# Patient Record
Sex: FEMALE | Race: ASIAN | NOT HISPANIC OR LATINO | Employment: FULL TIME | URBAN - METROPOLITAN AREA
[De-identification: names, ages, dates, MRNs, and addresses within clinical notes are randomized per-mention and may not be internally consistent; named-entity substitution may affect disease eponyms.]

---

## 2019-03-01 ENCOUNTER — MEDICAL CORRESPONDENCE (OUTPATIENT)
Dept: HEALTH INFORMATION MANAGEMENT | Facility: CLINIC | Age: 24
End: 2019-03-01

## 2019-03-03 ENCOUNTER — TRANSFERRED RECORDS (OUTPATIENT)
Dept: HEALTH INFORMATION MANAGEMENT | Facility: CLINIC | Age: 24
End: 2019-03-03

## 2019-03-04 ENCOUNTER — AMBULATORY - HEALTHEAST (OUTPATIENT)
Dept: MATERNAL FETAL MEDICINE | Facility: HOSPITAL | Age: 24
End: 2019-03-04

## 2019-03-04 ENCOUNTER — TELEPHONE (OUTPATIENT)
Dept: MATERNAL FETAL MEDICINE | Facility: CLINIC | Age: 24
End: 2019-03-04

## 2019-03-04 DIAGNOSIS — O26.90 PREGNANCY, ANTEPARTUM, COMPLICATIONS: ICD-10-CM

## 2019-03-04 DIAGNOSIS — O35.9XX0 SUSPECTED FETAL ANOMALY, ANTEPARTUM: ICD-10-CM

## 2019-03-04 DIAGNOSIS — O36.5990 PREGNANCY AFFECTED BY FETAL GROWTH RESTRICTION: Primary | ICD-10-CM

## 2019-03-04 NOTE — TELEPHONE ENCOUNTER
Called Pb and requested information on GI MD. Sent VITA to her husbands email. Will await VITA form patient to request records.  Jennifer Lang RN

## 2019-03-07 ENCOUNTER — TELEPHONE (OUTPATIENT)
Dept: MATERNAL FETAL MEDICINE | Facility: CLINIC | Age: 24
End: 2019-03-07

## 2019-03-07 ENCOUNTER — PRE VISIT (OUTPATIENT)
Dept: MATERNAL FETAL MEDICINE | Facility: CLINIC | Age: 24
End: 2019-03-07

## 2019-03-07 NOTE — TELEPHONE ENCOUNTER
Writer called and spoke to  to verify email address to send VITA for [facility]. VITA will be sent to correct email.  Jennifer Lang RN

## 2019-03-11 ENCOUNTER — OFFICE VISIT (OUTPATIENT)
Dept: MATERNAL FETAL MEDICINE | Facility: CLINIC | Age: 24
End: 2019-03-11
Attending: OBSTETRICS & GYNECOLOGY

## 2019-03-11 ENCOUNTER — AMBULATORY - HEALTHEAST (OUTPATIENT)
Dept: MATERNAL FETAL MEDICINE | Facility: HOSPITAL | Age: 24
End: 2019-03-11

## 2019-03-11 ENCOUNTER — HOSPITAL ENCOUNTER (OUTPATIENT)
Dept: ULTRASOUND IMAGING | Facility: CLINIC | Age: 24
Discharge: HOME | End: 2019-03-11
Attending: OBSTETRICS & GYNECOLOGY | Admitting: OBSTETRICS & GYNECOLOGY

## 2019-03-11 DIAGNOSIS — O35.9XX0 SUSPECTED FETAL ANOMALY, ANTEPARTUM, SINGLE OR UNSPECIFIED FETUS: Primary | ICD-10-CM

## 2019-03-11 DIAGNOSIS — O36.5990 PREGNANCY AFFECTED BY FETAL GROWTH RESTRICTION: ICD-10-CM

## 2019-03-11 DIAGNOSIS — O35.9XX0 SUSPECTED FETAL ANOMALY, ANTEPARTUM: ICD-10-CM

## 2019-03-11 DIAGNOSIS — O26.90 PREGNANCY, ANTEPARTUM, COMPLICATIONS: ICD-10-CM

## 2019-03-11 DIAGNOSIS — Z36.89 ENCOUNTER FOR ULTRASOUND TO ASSESS FETAL GROWTH: ICD-10-CM

## 2019-03-11 PROCEDURE — 76811 OB US DETAILED SNGL FETUS: CPT

## 2019-03-11 NOTE — PROGRESS NOTES
"Please see \"Imaging\" tab under \"Chart Review\" for details of today's US at the [facility].    Adrian Orr MD  Maternal-Fetal Medicine      "

## 2019-03-12 ENCOUNTER — DOCUMENTATION ONLY (OUTPATIENT)
Dept: MATERNAL FETAL MEDICINE | Facility: CLINIC | Age: 24
End: 2019-03-12

## 2019-03-12 NOTE — PROGRESS NOTES
Received VITA from [facility] for [facility]. Faxed 3/12 at [phone]. Will await records.  Jennifer Lang RN

## 2019-03-18 ENCOUNTER — TELEPHONE (OUTPATIENT)
Dept: MATERNAL FETAL MEDICINE | Facility: CLINIC | Age: 24
End: 2019-03-18

## 2019-03-18 ENCOUNTER — COMMUNICATION - HEALTHEAST (OUTPATIENT)
Dept: MATERNAL FETAL MEDICINE | Facility: HOSPITAL | Age: 24
End: 2019-03-18

## 2019-03-18 NOTE — TELEPHONE ENCOUNTER
Patient's  called to cancel appt. Stated that ultrasound was no longer necessary because pt will continue care at primary clinic. Writer called primary ([facility]) and spoke with nurse. Told nurse that pt did not wish to continue care with MFM. Will remove order from epic, clinic will need to place new order if they wish for pt to continue care with MFM.

## 2019-04-01 ENCOUNTER — OFFICE VISIT - HEALTHEAST (OUTPATIENT)
Dept: MATERNAL FETAL MEDICINE | Facility: HOSPITAL | Age: 24
End: 2019-04-01

## 2021-06-16 PROBLEM — Z34.90 PREGNANT: Status: ACTIVE | Noted: 2019-04-14

## 2021-06-24 NOTE — PROGRESS NOTES
MFM received referral from [facility].  Patient was seen for L2 and RAD consult on 3/11/19 at [facility].    Cat Joshua